# Patient Record
Sex: FEMALE | Race: WHITE
[De-identification: names, ages, dates, MRNs, and addresses within clinical notes are randomized per-mention and may not be internally consistent; named-entity substitution may affect disease eponyms.]

---

## 2017-02-27 ENCOUNTER — HOSPITAL ENCOUNTER (OUTPATIENT)
Dept: HOSPITAL 58 - LAB | Age: 62
Discharge: HOME | End: 2017-02-27
Attending: FAMILY MEDICINE

## 2017-02-27 DIAGNOSIS — J40: ICD-10-CM

## 2017-02-27 DIAGNOSIS — R05: ICD-10-CM

## 2017-02-27 DIAGNOSIS — J32.9: Primary | ICD-10-CM

## 2017-02-27 LAB
FLU INTERNAL QC: (no result)
FLUAV AG NPH QL: NEGATIVE
FLUBV AG NPH QL: NEGATIVE

## 2017-02-27 PROCEDURE — 87081 CULTURE SCREEN ONLY: CPT

## 2017-02-27 PROCEDURE — 87804 INFLUENZA ASSAY W/OPTIC: CPT

## 2017-09-12 ENCOUNTER — HOSPITAL ENCOUNTER (EMERGENCY)
Dept: HOSPITAL 58 - ED | Age: 62
Discharge: HOME | End: 2017-09-12

## 2017-09-12 VITALS — BODY MASS INDEX: 46.6 KG/M2

## 2017-09-12 VITALS — TEMPERATURE: 98.3 F | DIASTOLIC BLOOD PRESSURE: 83 MMHG | SYSTOLIC BLOOD PRESSURE: 144 MMHG

## 2017-09-12 DIAGNOSIS — S89.92XA: ICD-10-CM

## 2017-09-12 DIAGNOSIS — M17.0: ICD-10-CM

## 2017-09-12 DIAGNOSIS — S89.91XA: Primary | ICD-10-CM
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
## 2017-09-12 DIAGNOSIS — S59.902A: ICD-10-CM

## 2017-09-12 PROCEDURE — 99282 EMERGENCY DEPT VISIT SF MDM: CPT

## 2017-09-12 NOTE — DI
EXAM:  Radiographs, right knee 

  

HISTORY:  Initial presentation for right knee injury due to a fall. 

  

COMPARISON:  None available. 

  

TECHNIQUE:  Four views. 

  

  

FINDINGS:  Bone mineralization is decreased.  There is no fracture or dislocation.  Mild osteoarthrit
ic changes are present.  Small patellar enthesophytes noted.  Anterior subcutaneous edema seen in the
 knee. 

  

---------------------------- 

IMPRESSION: 

No fracture or dislocation.

## 2017-09-12 NOTE — DI
EXAM:  Four views of the right knee. 

  

History:  Right knee trauma. 

  

Findings:  No acute fracture or dislocation.  Superior patellar enthesiopathy.  Mild tricompartmental
 joint space narrowing. 

  

Impression:  No acute osseous abnormality.

## 2017-09-12 NOTE — ED.PDOC
General


ED Provider: 


Dr. TIMOTHY DORAN JR





Chief Complaint: Fall


Stated Complaint: slipped on linen on floor -- hit both knees with rt knee et 

lt elbow taking brunt of fall[End]1225 98.3 87 18 95% 144/83 4/10


Time Seen by Physician: 13:58


Mode of Arrival: Walk-In


Information Source: Patient


Exam Limitations: No limitations


Primary Care Provider: 


ANG GARCIA





Nursing and Triage Documentation Reviewed and Agree: No





Review of Systems





- Review Of Systems


Constitutional: Reports: Malaise, Weakness


Eyes: Reports: No symptoms


Ears, Nose, Mouth, Throat: Reports: No symptoms


Respiratory: Reports: No symptoms


Cardiac: Reports: No symptoms


GI: Reports: No symptoms


: Reports: No symptoms


Musculoskeletal: Reports: Joint pain


Skin: Reports: Bruising, Lesions


Neurological: Reports: No symptoms


Endocrine: Reports: No symptoms


Hematologic/Lymphatic: Reports: No symptoms


All Other Systems: Other





Past Medical History





- Past Medical History


Endocrine: Reports: None


Cardiovascular: Reports: Hypertension


Respiratory: Reports: Asthma


Hematological: Reports: None


Gastrointestinal: Reports: None


Genitourinary: Reports: None


Neuro/Psych: Reports: Depression


Musculoskeletal: Reports: None


Cancer: Reports: None


Last Menstrual Period: hysterectomy





- Surgical History


General Surgical History: Reports: Hysterectomy,  (x2), Hernia Repair (

2 c-sections, abdominal hernia repair, hysterectomy)





- Family History


Family History: Reports: Unknown





- Social History


Smoking Status: Never smoker


Hx Substance Use: No


Alcohol Screening: None





- Immunizations


Tetanus Shot up to Date: No ()





Physical Exam





- Physical Exam


Appearance: Well-appearing


Pain Distress: Moderate


Eyes: MAL, EOMI, Conjunctiva clear


ENT: Ears normal, Nose normal, Oropharynx normal


Neck: Supple


Respiratory: Airway patent, Breath sounds clear, Breath sounds equal, 

Respirations nonlabored


Cardiovascular: RRR, Pulses normal, No rub, No murmur


GI/: Soft, Nontender, No masses, Bowel sounds normal, No Organomegaly


Musculoskeletal: Normal strength, ROM intact, No calf tenderness (tender left 

elbow both patela tender), Edema


Skin: Warm, Dry, Normal color


Neurological: Sensation intact, Motor intact, Reflexes intact, Cranial nerves 

intact, Alert, Oriented


Psychiatric: Affect appropriate, Mood appropriate





Critical Care Note





- Critical Care Note


Total Time (mins): 0





Course





- Course


Orders, Labs, Meds: 


Orders











 Category Date Time Status


 


 ED WOUND CARE .ONCE EMERGENCY  17 14:03 Active


 


 KNEE, LEFT 4 VIEWS Stat RADS  17 14:01 Completed


 


 KNEE, RIGHT 4 VIEWS Stat RADS  17 13:58 Completed











Vital Signs: 


 











  Temp Pulse Resp BP Pulse Ox


 


 17 13:33  98.3 F  87  18  144/83 H  95














Departure





- Departure


Time of Disposition: 15:06


Disposition: HOME SELF-CARE


Discharge Problem: 


 Falls





Instructions:  Contusion in Adults (ED), Abrasion (ED)


Condition: Good


Pt referred to PMD for follow-up: Yes


Additional Instructions: 


fall precautions


ice 20 minutes three times a day


tylenol for pain avoid NSAIDS for three days 


recheck PMD one week repeat xrays if pain not resolved


note arthritis both knee caps


follow up PMD one week


Allergies/Adverse Reactions: 


Allergies





meperidine [From Demerol] Adverse Reaction (Verified 17 13:39)


 Anxiety








Home Medications: 


Ambulatory Orders





Budesonide/Formoterol Fumarate [Symbicort 160-4.5 Mcg Inhaler] 1 puff INH DAILY 

17 


Cholecalciferol (Vitamin D3) [Vitamin D3] 1 cap PO DAILY 17 


Fluoxetine HCl [Prozac] 40 mg PO BID 17 


Hydrochlorothiazide 12.5 mg PO DAILY 17 


Losartan Potassium [Cozaar] 50 mg PO DAILY 17 


Mirtazapine [Remeron] 7.5 mg PO BEDTIME 17 


Montelukast Sodium [Singulair] 10 mg PO BEDTIME 17 


Multivitamin [Daily Multiple Vitamin] 1 tab PO DAILY 17 








Disposition Discussed With: Patient

## 2017-10-25 ENCOUNTER — HOSPITAL ENCOUNTER (OUTPATIENT)
Dept: HOSPITAL 58 - RAD | Age: 62
Discharge: HOME | End: 2017-10-25
Attending: FAMILY MEDICINE

## 2017-10-25 VITALS — BODY MASS INDEX: 46.6 KG/M2
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
## 2017-10-25 DIAGNOSIS — M25.561: Primary | ICD-10-CM

## 2018-10-08 ENCOUNTER — HOSPITAL ENCOUNTER (EMERGENCY)
Dept: HOSPITAL 58 - ED | Age: 63
Discharge: HOME | End: 2018-10-08
Payer: COMMERCIAL

## 2018-10-08 VITALS — DIASTOLIC BLOOD PRESSURE: 85 MMHG | TEMPERATURE: 98.7 F | SYSTOLIC BLOOD PRESSURE: 134 MMHG

## 2018-10-08 VITALS — BODY MASS INDEX: 49.4 KG/M2
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
## 2018-10-08 DIAGNOSIS — S96.911A: Primary | ICD-10-CM

## 2018-10-08 DIAGNOSIS — S86.811A: ICD-10-CM

## 2018-10-08 PROCEDURE — 99283 EMERGENCY DEPT VISIT LOW MDM: CPT

## 2018-10-08 NOTE — DI
EXAM: Three views of the right foot 

  

HISTORY: Pain 

  

COMPARISON: None available 

  

FINDINGS: 

No fracture or dislocation is identified.  The joint spaces are maintained.  No erosive changes are s
een.  Moderate distal Achilles insertion and plantar fascial insertion degenerative calcaneal entheso
phytes are seen.  There is dorsal soft tissue swelling. 

  

IMPRESSION: 

No acute osseous abnormality. 

  

Dorsal soft tissue swelling. 

  

Calcaneal spurs.

## 2018-10-08 NOTE — ED.PDOC
General


ED Provider: 


Dr. JENNIFER SAMANIEGO





Chief Complaint: Foot Pain/Injury


Stated Complaint: Saturday; walking, ankle twisted - pain in ankle and knee 

with the movement although cought herself and did not go down.


Time Seen by Physician: 13:45


Mode of Arrival: Walk-In


Information Source: Patient


Exam Limitations: No limitations


Primary Care Provider: 


ANG GARCIA





Nursing and Triage Documentation Reviewed and Agree: Yes


Does patient meet sepsis criteria?: No


System Inflammatory Response Syndrome: Not Applicable


Sepsis Protocol: 


For patient's 13 years and over:





Temp is 96.8 and below  and greater


Pulse >90 BPM


Resp >20/minute


Acutely Altered Mental Status





Are patient's symptoms suggestive of a new infection, such as:


   -Pneumonia


   -Skin, Soft Tissue


   -Endocarditis


   -UTI


   -Bone, Joint Infection


   -Implantable Device


   -Acute Abdominal Infection


   -Wound Infection


   -Meningitis


   -Blood Stream Catheter Infection


   -Unknown








Musculoskeletal Complaint Exam





- Lower Extremity Complaint/Exam


Location of Pain: Reports: Right, Ankle, Knee


Mechanism of Injury: Reports: Other (Walking; R ankle twisted - did not fall.  

[place] immediate pain foot, ankle and knee - pain continues)


Onset/Duration: 2 days ago


Symptoms Are: Still present


Onset of Pain: Reports: Immediate


Initial Severity: Moderate


Current Severity: Moderate


Location: Reports: Diffuse (R foot and ankle - mostly hindfoot and anterior and 

posterior knee)


Character: Reports: Dull


Alleviating: Reports: Rest


Aggravating: Reports: Movement, Weight bearing


Able to Bear Weight: Yes


DVT Risk Factors: Reports: None


Differential Diagnoses: Arthritis, Strain, Sprain





Review of Systems





- Review Of Systems


Constitutional: Reports: No symptoms


Musculoskeletal: Reports: Joint pain (R knee, ankle and foot)


Neurological: Reports: No symptoms


All Other Systems: Reviewed and Negative





Past Medical History





- Past Medical History


Endocrine: Reports: None


Cardiovascular: Reports: Hypertension


Respiratory: Reports: Asthma


Hematological: Reports: None


Gastrointestinal: Reports: None


Genitourinary: Reports: None


Neuro/Psych: Reports: Depression


Musculoskeletal: Reports: None


Cancer: Reports: None


Last Menstrual Period: none


Other Pertinent Past Medical History: htn asthm dep 





- Surgical History


General Surgical History: Reports: Hysterectomy,  (x2), Hernia Repair (

2 c-sections, abdominal hernia repair, hysterectomy)





- Family History


Family History: Reports: Unknown





- Social History


Smoking Status: Never smoker


Hx Substance Use: No


Alcohol Screening: None





Physical Exam





- Physical Exam


Appearance: Well-appearing


Pain Distress: Mild


Musculoskeletal: Normal strength, ROM intact, No edema


Skin: Warm, Dry, Normal color


Neurological: Sensation intact, Motor intact, Alert, Oriented


Psychiatric: Affect appropriate, Mood appropriate





Interpretation





- Radiology Interpretation


Radiology Interpretation By: Radiologist


Radiology Results: No acute changes


Exam Interpreted: Other (R foot; ankle and knee.  Changes in foot/ankle not 

clinically correlated on an acute basis)





Re-Evaluation





- Re-Evaluation


Time of Re-Evaluation: 14:45


Status: Unchanged


Vital Signs Stable: Yes


Appearance: NAD


Neuro: Alert and Oriented X3 (Discussed X ray results and clinical picture)





Critical Care Note





- Critical Care Note


Total Time (mins): 15





Course





- Course


Orders, Labs, Meds: 


Orders











 Category Date Time Status


 


 ANKLE, RIGHT MIN 3 VIEWS Stat RADS  10/08/18 13:52 Completed


 


 FOOT, RIGHT 3 VIEWS Stat RADS  10/08/18 13:53 Completed


 


 KNEE, RIGHT 4 VIEWS Stat RADS  10/08/18 13:51 Completed











Vital Signs: 


 











  Temp Pulse Resp BP Pulse Ox


 


 10/08/18 13:00  98.7 F  80  18  134/85  93 L














Departure





- Departure


Time of Disposition: 14:58


Disposition: HOME SELF-CARE


Discharge Problem: 


 Strain of ankle, right, Strain of knee





Instructions:  Knee Pain (ED), Ankle Strain (ED)


Condition: Good


Pt referred to PMD for follow-up: Yes (Follow up with primary care provider)


IPMP verified?: No (Not indicated)


Additional Instructions: 


Activity as  tolerated; ibuprofen for discomfort.  Follow up with primary care 

provider if not better in 7 to 10 days.


Allergies/Adverse Reactions: 


Allergies





meperidine [From Demerol] Adverse Reaction (Verified 10/08/18 13:03)


 Anxiety








Home Medications: 


Ambulatory Orders





Budesonide/Formoterol Fumarate [Symbicort 160-4.5 Mcg Inhaler] 1 puff INH DAILY 

17 


Cholecalciferol (Vitamin D3) [Vitamin D3] 1 cap PO DAILY 17 


Fluoxetine HCl [Prozac] 40 mg PO BID 17 


Hydrochlorothiazide 12.5 mg PO DAILY 17 


Losartan Potassium [Cozaar] 50 mg PO DAILY 17 


Mirtazapine [Remeron] 7.5 mg PO BEDTIME 17 


Montelukast Sodium [Singulair] 10 mg PO BEDTIME 17 


Multivitamin [Daily Multiple Vitamin] 1 tab PO DAILY 17 


Azelastine HCl [Astelin 0.1%] 2 spray NS BID 10/08/18 


Fluticasone Propionate [Flonase] 2 spray NS DAILY 10/08/18

## 2018-10-08 NOTE — DI
Exam:  Three views of the right ankle. 

  

Comparison 05/16/2009 

  

Reason for exam:  Pain. 

  

FINDINGS:  The talar dome appears intact.  There are tiny osseous density seen adjacent to the latera
l malleolus on the frontal view.  No abnormal widening of the medial or lateral clear space.  There i
s a moderate amount of adjacent soft tissue swelling. 

  

Impression: 

1.  No obvious fracture or dislocation in the right ankle. 

2.  Soft tissue calcifications adjacent to the lateral malleolus on the frontal view may represent de
generative disease but may also represent tiny avulsion fracture.  Recommend correlation with the sit
e of patient's pain.

## 2018-10-08 NOTE — DI
EXAM:  RIGHT KNEE. 

  

HISTORY:  Knee pain. 

  

FINDINGS:  Right knee four view.  General bone density appears normal.  Articular cartilage within th
e joints is normal.  There is no osteochondral fragmentation, fracture or joint effusion.  Early enth
esopathy of the anterior patella.  Soft tissues reveal no acute finding. 

  

  

IMPRESSION: 

No acute radiographic finding.

## 2019-02-05 ENCOUNTER — HOSPITAL ENCOUNTER (OUTPATIENT)
Dept: HOSPITAL 58 - LAB | Age: 64
Discharge: HOME | End: 2019-02-05
Attending: NURSE ANESTHETIST, CERTIFIED REGISTERED
Payer: COMMERCIAL

## 2019-02-05 VITALS — BODY MASS INDEX: 49.4 KG/M2

## 2019-02-05 DIAGNOSIS — Z01.812: Primary | ICD-10-CM

## 2019-02-05 PROCEDURE — 80053 COMPREHEN METABOLIC PANEL: CPT

## 2019-02-05 PROCEDURE — 36415 COLL VENOUS BLD VENIPUNCTURE: CPT
